# Patient Record
Sex: MALE | Race: OTHER | HISPANIC OR LATINO | URBAN - NONMETROPOLITAN AREA
[De-identification: names, ages, dates, MRNs, and addresses within clinical notes are randomized per-mention and may not be internally consistent; named-entity substitution may affect disease eponyms.]

---

## 2023-10-05 PROBLEM — G47.30 SLEEP APNEA: Status: ACTIVE | Noted: 2023-10-05

## 2023-10-05 PROBLEM — M25.50 MULTIPLE JOINT PAIN: Status: ACTIVE | Noted: 2023-10-05

## 2023-10-05 PROBLEM — E78.5 HYPERLIPIDEMIA: Status: ACTIVE | Noted: 2023-10-05

## 2023-10-05 PROBLEM — F32.A DEPRESSION: Status: ACTIVE | Noted: 2023-10-05

## 2023-10-05 PROBLEM — F41.9 ANXIETY: Status: ACTIVE | Noted: 2023-10-05

## 2023-10-05 PROBLEM — M54.9 BACK PAIN: Status: ACTIVE | Noted: 2023-10-05

## 2023-10-05 RX ORDER — NABUMETONE 750 MG/1
1 TABLET, FILM COATED ORAL 2 TIMES DAILY PRN
COMMUNITY
Start: 2014-10-20

## 2023-10-05 RX ORDER — FENOFIBRATE 160 MG/1
1 TABLET ORAL DAILY
COMMUNITY
Start: 2014-09-23

## 2023-10-05 RX ORDER — KETOROLAC TROMETHAMINE 10 MG/1
2 TABLET, FILM COATED ORAL 2 TIMES DAILY
COMMUNITY
Start: 2013-10-25

## 2023-10-09 ENCOUNTER — APPOINTMENT (OUTPATIENT)
Dept: OTOLARYNGOLOGY | Facility: CLINIC | Age: 53
End: 2023-10-09
Payer: COMMERCIAL

## 2025-04-26 ENCOUNTER — OFFICE VISIT (OUTPATIENT)
Dept: URGENT CARE | Age: 55
End: 2025-04-26
Payer: COMMERCIAL

## 2025-04-26 VITALS
OXYGEN SATURATION: 97 % | BODY MASS INDEX: 33.84 KG/M2 | WEIGHT: 191 LBS | DIASTOLIC BLOOD PRESSURE: 90 MMHG | HEIGHT: 63 IN | HEART RATE: 76 BPM | RESPIRATION RATE: 16 BRPM | TEMPERATURE: 98.4 F | SYSTOLIC BLOOD PRESSURE: 156 MMHG

## 2025-04-26 DIAGNOSIS — K04.7 DENTAL ABSCESS: Primary | ICD-10-CM

## 2025-04-26 RX ORDER — AMOXICILLIN AND CLAVULANATE POTASSIUM 875; 125 MG/1; MG/1
875 TABLET, FILM COATED ORAL 2 TIMES DAILY
Qty: 20 TABLET | Refills: 0 | Status: SHIPPED | OUTPATIENT
Start: 2025-04-26 | End: 2025-05-06

## 2025-04-26 RX ORDER — SILDENAFIL 50 MG/1
TABLET, FILM COATED ORAL
COMMUNITY
Start: 2024-07-18

## 2025-04-26 RX ORDER — LIDOCAINE HYDROCHLORIDE 20 MG/ML
5 SOLUTION OROPHARYNGEAL ONCE
Status: COMPLETED | OUTPATIENT
Start: 2025-04-26 | End: 2025-04-26

## 2025-04-26 RX ORDER — CHLORHEXIDINE GLUCONATE ORAL RINSE 1.2 MG/ML
15 SOLUTION DENTAL 2 TIMES DAILY
Qty: 210 ML | Refills: 0 | Status: SHIPPED | OUTPATIENT
Start: 2025-04-26 | End: 2025-05-03

## 2025-04-26 RX ADMIN — LIDOCAINE HYDROCHLORIDE 5 ML: 20 SOLUTION OROPHARYNGEAL at 15:00

## 2025-04-26 NOTE — PROGRESS NOTES
"Subjective   Patient ID: Donald Diggs is a 54 y.o. male. They present today with a chief complaint of Oral Pain (Patient has abscess in mouth for around 5 days, painful).    History of Present Illness  See mdm       History provided by:  Patient   used: No    Oral Pain      Past Medical History  Allergies as of 04/26/2025    (No Known Allergies)       Prescriptions Prior to Admission[1]     Medical History[2]    Surgical History[3]     reports that he has never smoked. He has never used smokeless tobacco.    Review of Systems  Review of Systems   All other systems reviewed and are negative.                                 Objective    Vitals:    04/26/25 1237   BP: 156/90   BP Location: Left arm   Patient Position: Sitting   BP Cuff Size: Adult   Pulse: 76   Resp: 16   Temp: 36.9 °C (98.4 °F)   TempSrc: Oral   SpO2: 97%   Weight: 86.6 kg (191 lb)   Height: 1.6 m (5' 3\")     No LMP for male patient.    Physical Exam  Vitals and nursing note reviewed.   Constitutional:       General: He is not in acute distress.     Appearance: He is normal weight. He is not ill-appearing, toxic-appearing or diaphoretic.   HENT:      Head: Normocephalic and atraumatic.      Nose: Nose normal.      Mouth/Throat:      Mouth: Mucous membranes are moist.      Pharynx: No oropharyngeal exudate or posterior oropharyngeal erythema.        Comments: 1.5 cm area of edema, induration with fluctuance as marked above.  Significant dental disease present with caries.  No trismus.  Floor of the mouth is soft.  Oropharynx widely patent.  Right cheek very mild edema without erythema or tenderness  Eyes:      General: No scleral icterus.        Right eye: No discharge.         Left eye: No discharge.      Extraocular Movements: Extraocular movements intact.      Conjunctiva/sclera: Conjunctivae normal.      Pupils: Pupils are equal, round, and reactive to light.   Cardiovascular:      Rate and Rhythm: Normal rate and regular " rhythm.      Pulses: Normal pulses.   Pulmonary:      Effort: Pulmonary effort is normal. No respiratory distress.   Abdominal:      General: Abdomen is flat.   Musculoskeletal:      Cervical back: Normal range of motion and neck supple. No rigidity or tenderness.   Skin:     General: Skin is warm and dry.      Capillary Refill: Capillary refill takes less than 2 seconds.   Neurological:      General: No focal deficit present.      Mental Status: He is alert.   Psychiatric:         Mood and Affect: Mood normal.         Behavior: Behavior normal.         Incision and Drainage    Date/Time: 4/26/2025 1:21 PM    Performed by: Raisa Siegel PA-C  Authorized by: Raisa Siegel PA-C    Consent:     Consent obtained:  Verbal    Consent given by:  Patient    Risks, benefits, and alternatives were discussed: yes      Risks discussed:  Bleeding, incomplete drainage, pain and infection    Alternatives discussed:  Alternative treatment  Universal protocol:     Patient identity confirmed:  Verbally with patient  Location:     Type:  Abscess    Size:  1.5    Location:  Mouth    Mouth location:  Alveolar process  Sedation:     Sedation type:  None  Anesthesia:     Anesthesia method:  Topical application and local infiltration    Topical anesthetic:  Lidocaine gel    Local anesthetic:  Lidocaine 1% w/o epi  Procedure type:     Complexity:  Simple  Procedure details:     Ultrasound guidance: no      Incision types:  Stab incision    Incision depth:  Submucosal    Techniques: rinsed with peroxide.    Drainage:  Bloody and purulent    Drainage amount:  Moderate    Wound treatment:  Wound left open  Post-procedure details:     Procedure completion:  Tolerated well, no immediate complications      Point of Care Test & Imaging Results from this visit  No results found for this visit on 04/26/25.   Imaging  No results found.    Cardiology, Vascular, and Other Imaging  No other imaging results found for the past 2  days      Diagnostic study results (if any) were reviewed by Raisa Siegel PA-C.    Assessment/Plan   Allergies, medications, history, and pertinent labs/EKGs/Imaging reviewed by Raisa Siegel PA-C.     Medical Decision Making  54-year-old male presents with complaint of right maxillary dental pain.  Ongoing for about a week.  No systemic symptoms of illness.  He is eating and drinking and speaking normally.  Exam remarkable for alveolar ridge.  Abscess as documented above.  Patient consents to I&D.  Risks and benefits discussed.  Purulent fluid expelled from abscess.  Will treat with antibiotics and chlorhexidine rinse as well.  Patient's significant other is previous dental hygienist and has contacts he can reach out to.  Discussed importance of dental follow-up early this next week.  No features of facial cellulitis, sepsis, extensive abscess, Pavan angina or other emergency.  Encouraged follow-up with primary care provider.  Discussed expected course, indications for return or for presentation to emergency department.  Discharged good condition agreeable to plan as discussed.      Orders and Diagnoses  Diagnoses and all orders for this visit:  Dental abscess  -     amoxicillin-clavulanate (Augmentin) 875-125 mg tablet; Take 1 tablet (875 mg) by mouth 2 times a day for 10 days.  -     chlorhexidine (Peridex) 0.12 % solution; Use 15 mL in the mouth or throat 2 times a day for 7 days.  -     Referral to Dentistry; Future  -     Incision and Drainage  -     lidocaine (Xylocaine) 2 % mouth solution 5 mL      Medical Admin Record      Patient disposition: Home    Electronically signed by Raisa Siegel PA-C  1:28 PM           [1] (Not in a hospital admission)   [2]   Past Medical History:  Diagnosis Date    Personal history of (healed) traumatic fracture     History of fracture of vertebral column   [3]   Past Surgical History:  Procedure Laterality Date    UMBILICAL HERNIA REPAIR  10/20/2014    Umbilical  Hernia Repair    VASECTOMY  10/20/2014    Surgery Vas Deferens Vasectomy

## 2025-04-26 NOTE — PATIENT INSTRUCTIONS
Thank you for visiting  urgent care.  Your blood pressure was elevated today.  Follow-up with your primary care provider in the next 3-5 days for recheck of symptoms as well as recheck of blood pressure.  Go to emergency department for any new or worsening symptoms.     Follow up with dentistry.  If you have worsening pain, facial swelling, redness, fever, chills, inability to open mouth, swallow of breath go immediately to emergency department.